# Patient Record
Sex: FEMALE | Race: OTHER | NOT HISPANIC OR LATINO | URBAN - METROPOLITAN AREA
[De-identification: names, ages, dates, MRNs, and addresses within clinical notes are randomized per-mention and may not be internally consistent; named-entity substitution may affect disease eponyms.]

---

## 2017-12-16 ENCOUNTER — OUTPATIENT (OUTPATIENT)
Dept: OUTPATIENT SERVICES | Facility: HOSPITAL | Age: 52
LOS: 1 days | End: 2017-12-16
Payer: COMMERCIAL

## 2017-12-16 ENCOUNTER — APPOINTMENT (OUTPATIENT)
Dept: SLEEP CENTER | Facility: CLINIC | Age: 52
End: 2017-12-16
Payer: COMMERCIAL

## 2017-12-16 PROCEDURE — 95811 POLYSOM 6/>YRS CPAP 4/> PARM: CPT

## 2017-12-16 PROCEDURE — 95811 POLYSOM 6/>YRS CPAP 4/> PARM: CPT | Mod: 26

## 2017-12-18 DIAGNOSIS — G47.33 OBSTRUCTIVE SLEEP APNEA (ADULT) (PEDIATRIC): ICD-10-CM

## 2018-08-10 ENCOUNTER — APPOINTMENT (OUTPATIENT)
Dept: PULMONOLOGY | Facility: CLINIC | Age: 53
End: 2018-08-10

## 2018-11-12 ENCOUNTER — TRANSCRIPTION ENCOUNTER (OUTPATIENT)
Age: 53
End: 2018-11-12

## 2025-03-21 ENCOUNTER — EMERGENCY (EMERGENCY)
Facility: HOSPITAL | Age: 60
LOS: 1 days | Discharge: HOME | End: 2025-03-21
Attending: EMERGENCY MEDICINE
Payer: MEDICARE

## 2025-03-21 VITALS
SYSTOLIC BLOOD PRESSURE: 174 MMHG | HEIGHT: 66 IN | RESPIRATION RATE: 20 BRPM | HEART RATE: 81 BPM | OXYGEN SATURATION: 96 % | TEMPERATURE: 98 F | DIASTOLIC BLOOD PRESSURE: 101 MMHG | WEIGHT: 286.6 LBS

## 2025-03-21 VITALS
TEMPERATURE: 98 F | HEART RATE: 88 BPM | OXYGEN SATURATION: 95 % | RESPIRATION RATE: 19 BRPM | DIASTOLIC BLOOD PRESSURE: 94 MMHG | SYSTOLIC BLOOD PRESSURE: 184 MMHG

## 2025-03-21 DIAGNOSIS — K21.9 GASTRO-ESOPHAGEAL REFLUX DISEASE WITHOUT ESOPHAGITIS: ICD-10-CM

## 2025-03-21 PROBLEM — Z87.898 PERSONAL HISTORY OF OTHER SPECIFIED CONDITIONS: Chronic | Status: ACTIVE | Noted: 2024-07-19

## 2025-03-21 PROBLEM — K58.9 IRRITABLE BOWEL SYNDROME, UNSPECIFIED: Chronic | Status: ACTIVE | Noted: 2024-07-19

## 2025-03-21 PROBLEM — M54.9 DORSALGIA, UNSPECIFIED: Chronic | Status: ACTIVE | Noted: 2024-07-19

## 2025-03-21 PROBLEM — I20.89 OTHER FORMS OF ANGINA PECTORIS: Chronic | Status: ACTIVE | Noted: 2024-07-19

## 2025-03-21 PROBLEM — G62.9 POLYNEUROPATHY, UNSPECIFIED: Chronic | Status: ACTIVE | Noted: 2024-07-19

## 2025-03-21 LAB
ALBUMIN SERPL ELPH-MCNC: 3.5 G/DL — SIGNIFICANT CHANGE UP (ref 3.3–5.2)
ALP SERPL-CCNC: 79 U/L — SIGNIFICANT CHANGE UP (ref 40–120)
ALT FLD-CCNC: 8 U/L — SIGNIFICANT CHANGE UP
ANION GAP SERPL CALC-SCNC: 15 MMOL/L — SIGNIFICANT CHANGE UP (ref 5–17)
APPEARANCE UR: CLEAR — SIGNIFICANT CHANGE UP
APTT BLD: 33.8 SEC — SIGNIFICANT CHANGE UP (ref 24.5–35.6)
AST SERPL-CCNC: 12 U/L — SIGNIFICANT CHANGE UP
BACTERIA # UR AUTO: ABNORMAL /HPF
BASOPHILS # BLD AUTO: 0.03 K/UL — SIGNIFICANT CHANGE UP (ref 0–0.2)
BASOPHILS NFR BLD AUTO: 0.4 % — SIGNIFICANT CHANGE UP (ref 0–2)
BILIRUB SERPL-MCNC: 0.6 MG/DL — SIGNIFICANT CHANGE UP (ref 0.4–2)
BILIRUB UR-MCNC: NEGATIVE — SIGNIFICANT CHANGE UP
BLD GP AB SCN SERPL QL: SIGNIFICANT CHANGE UP
BUN SERPL-MCNC: 12.2 MG/DL — SIGNIFICANT CHANGE UP (ref 8–20)
CALCIUM SERPL-MCNC: 8.7 MG/DL — SIGNIFICANT CHANGE UP (ref 8.4–10.5)
CAST: 1 /LPF — SIGNIFICANT CHANGE UP (ref 0–4)
CHLORIDE SERPL-SCNC: 106 MMOL/L — SIGNIFICANT CHANGE UP (ref 96–108)
CO2 SERPL-SCNC: 23 MMOL/L — SIGNIFICANT CHANGE UP (ref 22–29)
COLOR SPEC: YELLOW — SIGNIFICANT CHANGE UP
CREAT SERPL-MCNC: 0.92 MG/DL — SIGNIFICANT CHANGE UP (ref 0.5–1.3)
DIFF PNL FLD: NEGATIVE — SIGNIFICANT CHANGE UP
EGFR: 72 ML/MIN/1.73M2 — SIGNIFICANT CHANGE UP
EGFR: 72 ML/MIN/1.73M2 — SIGNIFICANT CHANGE UP
EOSINOPHIL # BLD AUTO: 0.11 K/UL — SIGNIFICANT CHANGE UP (ref 0–0.5)
EOSINOPHIL NFR BLD AUTO: 1.4 % — SIGNIFICANT CHANGE UP (ref 0–6)
GLUCOSE SERPL-MCNC: 98 MG/DL — SIGNIFICANT CHANGE UP (ref 70–99)
GLUCOSE UR QL: NEGATIVE MG/DL — SIGNIFICANT CHANGE UP
HCT VFR BLD CALC: 34.1 % — LOW (ref 34.5–45)
HGB BLD-MCNC: 10.8 G/DL — LOW (ref 11.5–15.5)
IMM GRANULOCYTES # BLD AUTO: 0.02 K/UL — SIGNIFICANT CHANGE UP (ref 0–0.07)
IMM GRANULOCYTES NFR BLD AUTO: 0.2 % — SIGNIFICANT CHANGE UP (ref 0–0.9)
INR BLD: 1.11 RATIO — SIGNIFICANT CHANGE UP (ref 0.85–1.16)
KETONES UR-MCNC: NEGATIVE MG/DL — SIGNIFICANT CHANGE UP
LEUKOCYTE ESTERASE UR-ACNC: ABNORMAL
LYMPHOCYTES # BLD AUTO: 1.37 K/UL — SIGNIFICANT CHANGE UP (ref 1–3.3)
LYMPHOCYTES NFR BLD AUTO: 17.1 % — SIGNIFICANT CHANGE UP (ref 13–44)
MCHC RBC-ENTMCNC: 29.3 PG — SIGNIFICANT CHANGE UP (ref 27–34)
MCHC RBC-ENTMCNC: 31.7 G/DL — LOW (ref 32–36)
MCV RBC AUTO: 92.4 FL — SIGNIFICANT CHANGE UP (ref 80–100)
MONOCYTES # BLD AUTO: 0.49 K/UL — SIGNIFICANT CHANGE UP (ref 0–0.9)
MONOCYTES NFR BLD AUTO: 6.1 % — SIGNIFICANT CHANGE UP (ref 2–14)
NEUTROPHILS # BLD AUTO: 5.99 K/UL — SIGNIFICANT CHANGE UP (ref 1.8–7.4)
NEUTROPHILS NFR BLD AUTO: 74.8 % — SIGNIFICANT CHANGE UP (ref 43–77)
NITRITE UR-MCNC: NEGATIVE — SIGNIFICANT CHANGE UP
NRBC # BLD AUTO: 0 K/UL — SIGNIFICANT CHANGE UP (ref 0–0)
NRBC # FLD: 0 K/UL — SIGNIFICANT CHANGE UP (ref 0–0)
NRBC BLD AUTO-RTO: 0 /100 WBCS — SIGNIFICANT CHANGE UP (ref 0–0)
PH UR: 7.5 — SIGNIFICANT CHANGE UP (ref 5–8)
PLATELET # BLD AUTO: 222 K/UL — SIGNIFICANT CHANGE UP (ref 150–400)
PMV BLD: 11 FL — SIGNIFICANT CHANGE UP (ref 7–13)
POTASSIUM SERPL-MCNC: 4 MMOL/L — SIGNIFICANT CHANGE UP (ref 3.5–5.3)
POTASSIUM SERPL-SCNC: 4 MMOL/L — SIGNIFICANT CHANGE UP (ref 3.5–5.3)
PROT SERPL-MCNC: 6.5 G/DL — LOW (ref 6.6–8.7)
PROT UR-MCNC: NEGATIVE MG/DL — SIGNIFICANT CHANGE UP
PROTHROM AB SERPL-ACNC: 12.9 SEC — SIGNIFICANT CHANGE UP (ref 9.9–13.4)
RBC # BLD: 3.69 M/UL — LOW (ref 3.8–5.2)
RBC # FLD: 14.9 % — HIGH (ref 10.3–14.5)
RBC CASTS # UR COMP ASSIST: 3 /HPF — SIGNIFICANT CHANGE UP (ref 0–4)
SODIUM SERPL-SCNC: 143 MMOL/L — SIGNIFICANT CHANGE UP (ref 135–145)
SP GR SPEC: 1.01 — SIGNIFICANT CHANGE UP (ref 1–1.03)
SQUAMOUS # UR AUTO: 7 /HPF — HIGH (ref 0–5)
UROBILINOGEN FLD QL: 0.2 MG/DL — SIGNIFICANT CHANGE UP (ref 0.2–1)
WBC # BLD: 8.01 K/UL — SIGNIFICANT CHANGE UP (ref 3.8–10.5)
WBC # FLD AUTO: 8.01 K/UL — SIGNIFICANT CHANGE UP (ref 3.8–10.5)
WBC UR QL: 45 /HPF — HIGH (ref 0–5)

## 2025-03-21 PROCEDURE — 85025 COMPLETE CBC W/AUTO DIFF WBC: CPT

## 2025-03-21 PROCEDURE — 70491 CT SOFT TISSUE NECK W/DYE: CPT | Mod: 26

## 2025-03-21 PROCEDURE — 86901 BLOOD TYPING SEROLOGIC RH(D): CPT

## 2025-03-21 PROCEDURE — 81001 URINALYSIS AUTO W/SCOPE: CPT

## 2025-03-21 PROCEDURE — 99285 EMERGENCY DEPT VISIT HI MDM: CPT

## 2025-03-21 PROCEDURE — 70491 CT SOFT TISSUE NECK W/DYE: CPT | Mod: MC

## 2025-03-21 PROCEDURE — 99283 EMERGENCY DEPT VISIT LOW MDM: CPT | Mod: 25

## 2025-03-21 PROCEDURE — 80053 COMPREHEN METABOLIC PANEL: CPT

## 2025-03-21 PROCEDURE — 36415 COLL VENOUS BLD VENIPUNCTURE: CPT

## 2025-03-21 PROCEDURE — 31575 DIAGNOSTIC LARYNGOSCOPY: CPT

## 2025-03-21 PROCEDURE — 85730 THROMBOPLASTIN TIME PARTIAL: CPT

## 2025-03-21 PROCEDURE — 86900 BLOOD TYPING SEROLOGIC ABO: CPT

## 2025-03-21 PROCEDURE — 99284 EMERGENCY DEPT VISIT MOD MDM: CPT | Mod: 25

## 2025-03-21 PROCEDURE — 96365 THER/PROPH/DIAG IV INF INIT: CPT | Mod: XU

## 2025-03-21 PROCEDURE — 86850 RBC ANTIBODY SCREEN: CPT

## 2025-03-21 PROCEDURE — 96375 TX/PRO/DX INJ NEW DRUG ADDON: CPT | Mod: XU

## 2025-03-21 PROCEDURE — 85610 PROTHROMBIN TIME: CPT

## 2025-03-21 PROCEDURE — 87086 URINE CULTURE/COLONY COUNT: CPT

## 2025-03-21 RX ORDER — CEPHALEXIN 250 MG/1
1 CAPSULE ORAL
Qty: 14 | Refills: 0
Start: 2025-03-21 | End: 2025-03-27

## 2025-03-21 RX ORDER — KETOROLAC TROMETHAMINE 30 MG/ML
15 INJECTION, SOLUTION INTRAMUSCULAR; INTRAVENOUS ONCE
Refills: 0 | Status: DISCONTINUED | OUTPATIENT
Start: 2025-03-21 | End: 2025-03-21

## 2025-03-21 RX ORDER — DIPHENHYDRAMINE HCL 12.5MG/5ML
50 ELIXIR ORAL ONCE
Refills: 0 | Status: COMPLETED | OUTPATIENT
Start: 2025-03-21 | End: 2025-03-21

## 2025-03-21 RX ORDER — DEXAMETHASONE 0.5 MG/1
10 TABLET ORAL ONCE
Refills: 0 | Status: COMPLETED | OUTPATIENT
Start: 2025-03-21 | End: 2025-03-21

## 2025-03-21 RX ORDER — DEXAMETHASONE 0.5 MG/1
10 TABLET ORAL ONCE
Refills: 0 | Status: DISCONTINUED | OUTPATIENT
Start: 2025-03-21 | End: 2025-03-21

## 2025-03-21 RX ORDER — ACETAMINOPHEN 500 MG/5ML
1000 LIQUID (ML) ORAL ONCE
Refills: 0 | Status: DISCONTINUED | OUTPATIENT
Start: 2025-03-21 | End: 2025-03-21

## 2025-03-21 RX ADMIN — KETOROLAC TROMETHAMINE 15 MILLIGRAM(S): 30 INJECTION, SOLUTION INTRAMUSCULAR; INTRAVENOUS at 13:02

## 2025-03-21 RX ADMIN — Medication 20 MILLIGRAM(S): at 13:01

## 2025-03-21 RX ADMIN — DEXAMETHASONE 102 MILLIGRAM(S): 0.5 TABLET ORAL at 13:36

## 2025-03-21 RX ADMIN — Medication 50 MILLIGRAM(S): at 13:01

## 2025-03-21 RX ADMIN — KETOROLAC TROMETHAMINE 15 MILLIGRAM(S): 30 INJECTION, SOLUTION INTRAMUSCULAR; INTRAVENOUS at 14:03

## 2025-03-21 RX ADMIN — DEXAMETHASONE 10 MILLIGRAM(S): 0.5 TABLET ORAL at 14:03

## 2025-03-21 NOTE — ED PROVIDER NOTE - OBJECTIVE STATEMENT
59 year old female with PMHx DM, HTN, HLD, CVA, IBS presenting for evaluation of throat pain since last night. Patient reports pain with swallowing, this morning felt it was harder to swallow. Reports feeling like her medications were becoming stuck in her throat. Also feels her lower lip is more swollen and is tingling. Notes recent runny nose and cough. Denies any fever, chills, vomiting, diarrhea, rash, dentalgia. Patient does note has been taking lisinopril since 2015. Denies new foods/exposures.

## 2025-03-21 NOTE — ED PROVIDER NOTE - NSFOLLOWUPINSTRUCTIONS_ED_ALL_ED_FT
- FOR PAIN: You may take Tylenol 1000mg every 6 hours or Ibuprofen 600mg every 6 hours as needed. It is safe to combine these medications should you need to take both.   - Please follow up with your gastroenterologist and primary care doctor as discussed.  - Would advise you increase your Famotidine to 40mg at night. Please review this with your gastroenterologist during your follow up.   - To help with the reflux, we recommend avoiding spicy foods, caffeine, chocolate; avoid laying flat for at least 2 hours after eating, weight loss may help as well.    Return to the emergency department for concerning symptoms.     Feel better and good luck!

## 2025-03-21 NOTE — ED ADULT NURSE REASSESSMENT NOTE - NS ED NURSE REASSESS COMMENT FT1
[id number] minor sore throat, pending CT results. pt airway patent, tolerating secretions, denies difficulty breathing at this time

## 2025-03-21 NOTE — ED PROVIDER NOTE - CARE PLAN
1 Principal Discharge DX:	Laryngopharyngeal reflux (LPR)  Secondary Diagnosis:	Sore throat  Secondary Diagnosis:	Acute UTI

## 2025-03-21 NOTE — ED PROVIDER NOTE - NSICDXFAMILYHX_GEN_ALL_CORE_FT
FAMILY HISTORY:  Mother  Still living? Unknown  FH: type 2 diabetes, Age at diagnosis: Age Unknown    Sibling  Still living? No  FHx: ovarian cancer, Age at diagnosis: Age Unknown    Grandparent  Still living? No  Family history of heart attack, Age at diagnosis: Age Unknown  FH: type 2 diabetes, Age at diagnosis: Age Unknown

## 2025-03-21 NOTE — ED PROVIDER NOTE - CLINICAL SUMMARY MEDICAL DECISION MAKING FREE TEXT BOX
59F presenting for evaluation of throat pain since last night, now with difficulty swallowing and lower lip swelling with tingling. Patient does note she is in lisinopril, denies new exposures/medications, no rashes. Patient is tolerating secretions, in no apparent distress, airway patent, patient is tender to neck/throat. Plan labs, ct neck, symptomatic treatment.  Disposition pending results/clinical course.

## 2025-03-21 NOTE — CONSULT NOTE ADULT - ASSESSMENT
59 year old female with PMHx DM, HTN, HLD, CVA, and IBS presents here today for evaluation of throat pain since last night. Patient reports pain with swallowing, and this morning felt it was harder to swallow and she felt like her medications were getting stuck in her throat. She also states that her voice has become hoarse. She feels that her lower lip is more swollen. Of note, patient has been taking Lisinopril since 2015. Denies any fever, chills, vomiting, dizziness, breathing difficulty. Denies new foods/exposures. ENT was consulted for sore throat. Physical exam was within normal limits. Laryngoscopy shows some reflux changes, otherwise normal.

## 2025-03-21 NOTE — ED PROVIDER NOTE - PROGRESS NOTE DETAILS
Giselle- Patient's daughter gave consent for IV contrast study Patient s/p fiberoptic indirect laryngoscopy by ENT- grossly normal but w/findings of laryngopharyngeal reflux. Labs noted, non actionable. patient c/o pain with urinating with +UA, will rx abx. CT demonstrating patent airway structures, prominent b/l lymph nodes- reviewed with patient, she will f/u with pcp for reevaluation and possible ultrasound. Patient is sitting up, speaking in clear sentences, tolerating own secretions, tolerating meal of chicken without any distress. Patient has f/u with GI on 3/30, has f/u with PCP in April. advised f/u, strict return precautions.

## 2025-03-21 NOTE — CONSULT NOTE ADULT - PROBLEM SELECTOR RECOMMENDATION 9
- Patient takes Protonix 40 mg in AM and Famotidine 20 mg in PM, advised her to increase Famotidine to 40 mg in PM  - Discussed lifestyle changes including but not limited to avoiding spicy foods, caffeine, chocolate; weight loss may help as well  - Otherwise, ENT will sign off at this time, please re-consult if needed  - May see ENT outpatient if needed at [address] [place] [address] [phone]

## 2025-03-21 NOTE — ED ADULT NURSE NOTE - NSICDXPASTSURGICALHX_GEN_ALL_CORE_FT
PAST SURGICAL HISTORY:  H/O hand surgery RUE Plate due to deteriorating bone    History of carpal tunnel repair RUE    History of tubal ligation

## 2025-03-21 NOTE — ED PROVIDER NOTE - NSICDXPASTSURGICALHX_GEN_ALL_CORE_FT
PAST SURGICAL HISTORY:  H/O hand surgery RUE Plate due to deteriorating bone    History of carpal tunnel repair RUE    History of tubal ligation      Location Indication Override (Is Already Calculated Based On Selected Body Location): Area H

## 2025-03-21 NOTE — ED PROVIDER NOTE - ATTENDING CONTRIBUTION TO CARE
59-year-old female with history of diabetes, hypertension, hyperlipidemia,  CVA and IBS presents to the ED complaining of waking up this morning with worsening throat pain with reported dysphagia and dysphonia with difficulty swallowing her medications.  Patient denies any associated chest pain, shortness of breath, abdominal pain, nausea, vomiting, recent illness, sick contacts or new medications.  On exam patient awake and alert patent airway no drooling, speaking in soft, hoarse voice.  PERRL, mucosa remains moist, posterior pharynx clear, tongue and uvula midline, no glossal  or uvular edema,  neck supple questionable mild soft tissue swelling under chin, no brawny edema or skin changes concerning for cellulitis,  heart regular, lungs clear bilaterally, abdomen soft no localized tenderness, extremities no cyanosis or edema, neuro no focal deficits.  Unclear if etiology of patient's symptoms.  Will start on IV steroids, Benadryl and Pepcid, check CT soft tissue neck with IV contrast and consult ENT

## 2025-03-21 NOTE — ED ADULT TRIAGE NOTE - CHIEF COMPLAINT QUOTE
pt arrives to ED c/o sore throat that started last night, pt is unable to swallow or speak, denies N/V/D/CP/SOB/tongue swelling, history of stroke/DM/throat ulcers/HTN

## 2025-03-21 NOTE — ED PROVIDER NOTE - NSICDXPASTMEDICALHX_GEN_ALL_CORE_FT
PAST MEDICAL HISTORY:  Chronic back pain     Chronic stable angina     CVA (cerebral vascular accident) 10/2014 left side weakness    Dyslipidemia     History of chronic cough     HTN (hypertension)     IBS (irritable bowel syndrome)     Neuropathy     Obese

## 2025-03-21 NOTE — ED PROVIDER NOTE - PATIENT PORTAL LINK FT
You can access the FollowMyHealth Patient Portal offered by [facility] by registering at the following website: [email or web address]. By joining [facility]’s FollowMyHealth portal, you will also be able to view your health information using other applications (apps) compatible with our system.

## 2025-03-21 NOTE — ED PROVIDER NOTE - PHYSICAL EXAMINATION
Gen: well nourished female in no apparent distress   Head: normocephalic, atraumatic  EENT: EOMI, moist mucous membranes, no scleral icterus. (+)poor dentition. (+)tenderness to palpation b/l neck/throat. airway patent  Lung: no increased work of breathing, clear to auscultation bilaterally. tolerating secretions  CV: regular rate, regular rhythm, normal s1/s2  Abd: soft, non-tender, non-distended  MSK: No edema, no visible deformities, full range of motion in all 4 extremities  Neuro: Awake, alert, clear speech, no facial asymmetry, moving all extremities against gravity Gen: well nourished female in no apparent distress   Head: normocephalic, atraumatic  EENT: EOMI, moist mucous membranes, no scleral icterus. (+)poor dentition. (+)tenderness to palpation b/l neck/throat. airway patent. no evidence of angioedema.   Lung: no increased work of breathing, clear to auscultation bilaterally. tolerating secretions  CV: regular rate, regular rhythm, normal s1/s2  Abd: soft, non-tender, non-distended  MSK: No edema, no visible deformities, full range of motion in all 4 extremities  Neuro: Awake, alert, clear speech, no facial asymmetry, moving all extremities against gravity

## 2025-03-21 NOTE — CONSULT NOTE ADULT - SUBJECTIVE AND OBJECTIVE BOX
CC: sore throat    HPI: 59 year old female with PMHx DM, HTN, HLD, CVA, and IBS presents here today for evaluation of throat pain since last night. Patient reports pain with swallowing, and this morning felt it was harder to swallow and she felt like her medications were getting stuck in her throat. She also states that her voice has become hoarse. She feels that her lower lip is more swollen. Of note, patient has been taking Lisinopril since 2015. Denies any fever, chills, vomiting, dizziness, breathing difficulty. Denies new foods/exposures. ENT was consulted for sore throat.       PAST MEDICAL & SURGICAL HISTORY:  HTN (hypertension)      Dyslipidemia      Obese      CVA (cerebral vascular accident)  10/2014 left side weakness      IBS (irritable bowel syndrome)      History of chronic cough      Chronic stable angina      Chronic back pain      Neuropathy      History of tubal ligation      History of carpal tunnel repair  RUE      H/O hand surgery  RUE Plate due to deteriorating bone        Allergies    shellfish (Short breath; Rash)  Cardizem (Rash)  terbinafine (Rash)  atorvastatin (Rash)    Intolerances      MEDICATIONS  (STANDING):    MEDICATIONS  (PRN):      Family history: No pertinent family history in first degree relatives    ROS:   ENT: all negative except as noted in HPI   CV: denies palpitations  Pulm: denies SOB, cough, hemoptysis  Neuro: denies numbness/tingling, loss of sensation  Heme: denies easy bruising or bleeding  Endo: denies heat/cold intolerance, excessive sweating    Vital Signs Last 24 Hrs  T(C): 36.8 (21 Mar 2025 13:29), Max: 36.9 (21 Mar 2025 11:38)  T(F): 98.2 (21 Mar 2025 13:29), Max: 98.5 (21 Mar 2025 11:38)  HR: 85 (21 Mar 2025 13:29) (81 - 85)  BP: 171/100 (21 Mar 2025 13:29) (171/100 - 174/101)  BP(mean): --  RR: 18 (21 Mar 2025 13:29) (18 - 20)  SpO2: 97% (21 Mar 2025 13:29) (96% - 97%)    Parameters below as of 21 Mar 2025 13:29  Patient On (Oxygen Delivery Method): room air                              10.8   8.01  )-----------( 222      ( 21 Mar 2025 12:50 )             34.1        PT/INR - ( 21 Mar 2025 12:50 )   PT: 12.9 sec;   INR: 1.11 ratio    PTT - ( 21 Mar 2025 12:50 )  PTT:33.8 sec    PHYSICAL EXAM:  Gen: NAD  Skin: No rashes, bruises, or lesions  Head: Normocephalic, Atraumatic  Face: no edema, erythema, or fluctuance.  Nose: Nares bilaterally patent, no discharge  Mouth: No stridor, no drooling, no trismus.  Mucosa moist, tongue/uvula midline, oropharynx clear  Neck: Flat, supple, no lymphadenopathy, trachea midline, no masses  Resp: breathing easily, no stridor      Fiberoptic Indirect laryngoscopy:   Nasopharynx, oropharynx clear, no bleeding. Erythema noted on posterior pharyngeal wall and base of tongue. Vallecula, epiglottis, and subglottis appear normal. No edema, pooling of secretions, masses or lesions. Airway patent, no foreign body visualized. No glottic/supraglottic edema. True vocal cords, arytenoids, vestibular folds, ventricles, pyriform sinuses, and aryepiglottic folds appear normal bilaterally. Vocal cords mobile with good contact b/l.

## 2025-03-23 LAB
CULTURE RESULTS: SIGNIFICANT CHANGE UP
SPECIMEN SOURCE: SIGNIFICANT CHANGE UP